# Patient Record
Sex: FEMALE | Race: ASIAN | NOT HISPANIC OR LATINO | URBAN - METROPOLITAN AREA
[De-identification: names, ages, dates, MRNs, and addresses within clinical notes are randomized per-mention and may not be internally consistent; named-entity substitution may affect disease eponyms.]

---

## 2017-09-24 ENCOUNTER — EMERGENCY (EMERGENCY)
Facility: HOSPITAL | Age: 72
LOS: 1 days | End: 2017-09-24

## 2018-07-18 ENCOUNTER — EMERGENCY (EMERGENCY)
Facility: HOSPITAL | Age: 73
LOS: 1 days | End: 2018-07-18
Payer: MEDICARE

## 2018-07-18 PROCEDURE — 99284 EMERGENCY DEPT VISIT MOD MDM: CPT

## 2018-07-18 PROCEDURE — 70450 CT HEAD/BRAIN W/O DYE: CPT | Mod: 26

## 2018-09-30 ENCOUNTER — RX RENEWAL (OUTPATIENT)
Age: 73
End: 2018-09-30

## 2018-09-30 PROBLEM — Z00.00 ENCOUNTER FOR PREVENTIVE HEALTH EXAMINATION: Status: ACTIVE | Noted: 2018-09-30

## 2018-11-09 ENCOUNTER — APPOINTMENT (OUTPATIENT)
Dept: ENDOCRINOLOGY | Facility: CLINIC | Age: 73
End: 2018-11-09
Payer: MEDICARE

## 2018-11-09 ENCOUNTER — RECORD ABSTRACTING (OUTPATIENT)
Age: 73
End: 2018-11-09

## 2018-11-09 VITALS
BODY MASS INDEX: 21.79 KG/M2 | HEIGHT: 60 IN | WEIGHT: 111 LBS | DIASTOLIC BLOOD PRESSURE: 70 MMHG | SYSTOLIC BLOOD PRESSURE: 120 MMHG | HEART RATE: 78 BPM

## 2018-11-09 DIAGNOSIS — Z83.3 FAMILY HISTORY OF DIABETES MELLITUS: ICD-10-CM

## 2018-11-09 DIAGNOSIS — Z86.39 PERSONAL HISTORY OF OTHER ENDOCRINE, NUTRITIONAL AND METABOLIC DISEASE: ICD-10-CM

## 2018-11-09 DIAGNOSIS — Z78.9 OTHER SPECIFIED HEALTH STATUS: ICD-10-CM

## 2018-11-09 PROCEDURE — 99214 OFFICE O/P EST MOD 30 MIN: CPT

## 2019-02-01 ENCOUNTER — OTHER (OUTPATIENT)
Age: 74
End: 2019-02-01

## 2019-03-15 ENCOUNTER — APPOINTMENT (OUTPATIENT)
Dept: ENDOCRINOLOGY | Facility: CLINIC | Age: 74
End: 2019-03-15

## 2019-03-20 ENCOUNTER — RX RENEWAL (OUTPATIENT)
Age: 74
End: 2019-03-20

## 2019-05-04 ENCOUNTER — RX RENEWAL (OUTPATIENT)
Age: 74
End: 2019-05-04

## 2019-05-06 ENCOUNTER — APPOINTMENT (OUTPATIENT)
Dept: ENDOCRINOLOGY | Facility: CLINIC | Age: 74
End: 2019-05-06
Payer: MEDICARE

## 2019-05-06 LAB — GLUCOSE BLDC GLUCOMTR-MCNC: 138

## 2019-05-06 PROCEDURE — 99214 OFFICE O/P EST MOD 30 MIN: CPT | Mod: 25

## 2019-05-06 PROCEDURE — 82962 GLUCOSE BLOOD TEST: CPT

## 2019-05-06 NOTE — HISTORY OF PRESENT ILLNESS
[FreeTextEntry1] : Quality:  Type 2.   \par Severity:  controlled\par Duration:  12 years\par Associated Symptoms:  Hypoglycemia \par Notes:  Current regimen:\par 	glipizide 5 am 10 pm\par 	janumet 50/1000 bid - cuts down to one at times due to cost\par \par Blood Glucose Testing Information \par \par Patient is using the  meter and is testing 2 times per day.\par \par \par Past Medical History\par Notes:  thyroid nodule. FNA 1996 limited; repeat FNA 2004 negative. \par

## 2019-05-06 NOTE — ASSESSMENT
[FreeTextEntry1] : DM type 2, with room for improvement. Pt. can do better with diet; I am reluctant to increase meds due to risk of adverse effects. IN fact may need less glipizide in the evening with smaller meals.\par hyperlipidemia controlled\par no palpable thyroid nodule

## 2019-05-30 ENCOUNTER — MEDICATION RENEWAL (OUTPATIENT)
Age: 74
End: 2019-05-30

## 2019-06-10 ENCOUNTER — RX RENEWAL (OUTPATIENT)
Age: 74
End: 2019-06-10

## 2019-08-02 ENCOUNTER — RX RENEWAL (OUTPATIENT)
Age: 74
End: 2019-08-02

## 2019-09-05 ENCOUNTER — OUTPATIENT (OUTPATIENT)
Dept: OUTPATIENT SERVICES | Facility: HOSPITAL | Age: 74
LOS: 1 days | End: 2019-09-05

## 2019-09-19 ENCOUNTER — APPOINTMENT (OUTPATIENT)
Dept: ENDOCRINOLOGY | Facility: CLINIC | Age: 74
End: 2019-09-19
Payer: MEDICARE

## 2019-09-19 VITALS
HEART RATE: 75 BPM | OXYGEN SATURATION: 97 % | HEIGHT: 60 IN | DIASTOLIC BLOOD PRESSURE: 70 MMHG | BODY MASS INDEX: 21.99 KG/M2 | SYSTOLIC BLOOD PRESSURE: 100 MMHG | WEIGHT: 112 LBS

## 2019-09-19 LAB
GLUCOSE BLDC GLUCOMTR-MCNC: 178
HBA1C MFR BLD HPLC: 7.7
LDLC SERPL CALC-MCNC: 87
MICROALBUMIN/CREAT 24H UR-RTO: 8

## 2019-09-19 PROCEDURE — 99214 OFFICE O/P EST MOD 30 MIN: CPT | Mod: 25

## 2019-09-19 PROCEDURE — 82962 GLUCOSE BLOOD TEST: CPT

## 2019-09-19 NOTE — PHYSICAL EXAM
[Thyroid Not Enlarged] : the thyroid was not enlarged [Clear to Auscultation] : lungs were clear to auscultation bilaterally [Regular Rhythm] : with a regular rhythm [Left Foot Was Examined] : left foot ~C was examined [Right Foot Was Examined] : right foot ~C was examined

## 2019-10-25 ENCOUNTER — RX RENEWAL (OUTPATIENT)
Age: 74
End: 2019-10-25

## 2020-01-19 ENCOUNTER — RX RENEWAL (OUTPATIENT)
Age: 75
End: 2020-01-19

## 2020-02-03 ENCOUNTER — APPOINTMENT (OUTPATIENT)
Dept: RADIOLOGY | Facility: CLINIC | Age: 75
End: 2020-02-03
Payer: MEDICARE

## 2020-02-03 PROCEDURE — 77080 DXA BONE DENSITY AXIAL: CPT

## 2020-02-05 ENCOUNTER — APPOINTMENT (OUTPATIENT)
Dept: ENDOCRINOLOGY | Facility: CLINIC | Age: 75
End: 2020-02-05

## 2020-03-15 ENCOUNTER — RX RENEWAL (OUTPATIENT)
Age: 75
End: 2020-03-15

## 2020-04-19 ENCOUNTER — RX RENEWAL (OUTPATIENT)
Age: 75
End: 2020-04-19

## 2020-04-22 ENCOUNTER — RX RENEWAL (OUTPATIENT)
Age: 75
End: 2020-04-22

## 2020-09-03 ENCOUNTER — RX RENEWAL (OUTPATIENT)
Age: 75
End: 2020-09-03

## 2020-10-15 ENCOUNTER — APPOINTMENT (OUTPATIENT)
Dept: CT IMAGING | Facility: CLINIC | Age: 75
End: 2020-10-15

## 2020-10-19 ENCOUNTER — RX RENEWAL (OUTPATIENT)
Age: 75
End: 2020-10-19

## 2020-10-26 ENCOUNTER — APPOINTMENT (OUTPATIENT)
Dept: ULTRASOUND IMAGING | Facility: CLINIC | Age: 75
End: 2020-10-26
Payer: MEDICARE

## 2020-10-26 PROCEDURE — 76536 US EXAM OF HEAD AND NECK: CPT

## 2020-10-26 PROCEDURE — 99072 ADDL SUPL MATRL&STAF TM PHE: CPT

## 2020-11-23 ENCOUNTER — APPOINTMENT (OUTPATIENT)
Dept: MAMMOGRAPHY | Facility: CLINIC | Age: 75
End: 2020-11-23
Payer: MEDICARE

## 2020-11-23 PROCEDURE — 77067 SCR MAMMO BI INCL CAD: CPT

## 2020-11-23 PROCEDURE — 77063 BREAST TOMOSYNTHESIS BI: CPT

## 2020-12-17 ENCOUNTER — RX RENEWAL (OUTPATIENT)
Age: 75
End: 2020-12-17

## 2020-12-22 ENCOUNTER — RX RENEWAL (OUTPATIENT)
Age: 75
End: 2020-12-22

## 2021-01-14 ENCOUNTER — APPOINTMENT (OUTPATIENT)
Dept: ENDOCRINOLOGY | Facility: CLINIC | Age: 76
End: 2021-01-14
Payer: MEDICARE

## 2021-01-14 VITALS
HEART RATE: 87 BPM | WEIGHT: 112 LBS | BODY MASS INDEX: 21.99 KG/M2 | DIASTOLIC BLOOD PRESSURE: 78 MMHG | OXYGEN SATURATION: 95 % | SYSTOLIC BLOOD PRESSURE: 132 MMHG | HEIGHT: 60 IN

## 2021-01-14 LAB — GLUCOSE BLDC GLUCOMTR-MCNC: 218

## 2021-01-14 PROCEDURE — 82962 GLUCOSE BLOOD TEST: CPT

## 2021-01-14 PROCEDURE — 99213 OFFICE O/P EST LOW 20 MIN: CPT | Mod: 25

## 2021-01-14 PROCEDURE — 99072 ADDL SUPL MATRL&STAF TM PHE: CPT

## 2021-01-14 NOTE — HISTORY OF PRESENT ILLNESS
[FreeTextEntry1] : Quality:  Type 2.   \par Severity:  controlled\par Duration:  13 years\par Associated Symptoms:  Hypoglycemia \par Notes:  Current regimen:\par 	glipizide 5 am 10 pm\par 	janumet 50/1000 bid \par \par Blood Glucose Testing Information \par \par Patient is using the  meter and is testing 2 times per day.\par \par \par Past Medical History\par Notes:  thyroid nodule. FNA 1996 limited; repeat FNA 2004 negative. \par thyroid ultrasound done several months ago

## 2021-01-14 NOTE — ASSESSMENT
[FreeTextEntry1] : thyroid nodule, requires follow up. WIll try to review recent ultrasound\par pt. prefers for PCP to care for her diabetes

## 2021-09-22 ENCOUNTER — RX RENEWAL (OUTPATIENT)
Age: 76
End: 2021-09-22

## 2021-09-24 ENCOUNTER — RX RENEWAL (OUTPATIENT)
Age: 76
End: 2021-09-24

## 2021-12-03 ENCOUNTER — APPOINTMENT (OUTPATIENT)
Dept: ENDOCRINOLOGY | Facility: CLINIC | Age: 76
End: 2021-12-03
Payer: MEDICARE

## 2021-12-03 VITALS
WEIGHT: 111 LBS | HEIGHT: 60 IN | DIASTOLIC BLOOD PRESSURE: 70 MMHG | HEART RATE: 84 BPM | SYSTOLIC BLOOD PRESSURE: 132 MMHG | BODY MASS INDEX: 21.79 KG/M2 | OXYGEN SATURATION: 96 %

## 2021-12-03 LAB — GLUCOSE BLDC GLUCOMTR-MCNC: 118

## 2021-12-03 PROCEDURE — 99214 OFFICE O/P EST MOD 30 MIN: CPT | Mod: 25

## 2021-12-03 PROCEDURE — 82962 GLUCOSE BLOOD TEST: CPT

## 2021-12-03 PROCEDURE — 95250 CONT GLUC MNTR PHYS/QHP EQP: CPT

## 2021-12-03 NOTE — ASSESSMENT
[FreeTextEntry1] : 77 y/o female with Type 2 Diabetes, Hypercholesterolemia, and thyroid nodule. \par \par Plan: \par Type 2 Diabetes: worsening \par - check c-peptide and glucose level now\par - placed [employer] sensor on left upper arm lot # [id number] \par - continue current Rx for now \par - depending on c-peptide results whether if she is still producing insulin \par - if c-peptide negative then consider Rybelsus, SGLT2 or Piogliazone \par - if c-peptide positive then will start basal insulin \par - continue current exercise regimen \par - follow a low fat/carb diet \par - continue to follow up with ophthalmology \par - repeat A1C in 3 months \par \par Hypercholesterolemia: continue statin \par - repeat lipids in 3 months \par \par Thyroid nodule: check thyroid sonogram now\par \par RTO in 2 weeks with NP or CDE \par \par Dr. Srinivasan reviewed chart and agrees with plan.  [Importance of Diet and Exercise] : importance of diet and exercise to improve glycemic control, achieve weight loss and improve cardiovascular health

## 2021-12-03 NOTE — REVIEW OF SYSTEMS
[Recent Weight Gain (___ Lbs)] : recent weight gain: [unfilled] lbs [Blurred Vision] : blurred vision [Fatigue] : no fatigue [Decreased Appetite] : appetite not decreased [Visual Field Defect] : no visual field defect [Dysphagia] : no dysphagia [Neck Pain] : no neck pain [Dysphonia] : no dysphonia [Chest Pain] : no chest pain [Palpitations] : no palpitations [Constipation] : no constipation [Diarrhea] : no diarrhea [Polyuria] : no polyuria [Dysuria] : no dysuria [Headaches] : no headaches [Tremors] : no tremors [Depression] : no depression [Anxiety] : no anxiety [Polydipsia] : no polydipsia [Swelling] : no swelling [FreeTextEntry3] : following up ophthalmology

## 2021-12-03 NOTE — HISTORY OF PRESENT ILLNESS
[FreeTextEntry1] : Quality: Type 2 DM \par Severity: controlled\par Duration: 13 years\par Associated Symptoms: Hypoglycemia \par \par Current Regimen:\par glipizide 5 mg 3 times a day \par janumet  bid \par \par Self blood sugar monitorin-2 times a day \par this morning 190 \par before lunch 130 \par Current \par \par exercise: daily - goes to gym \par \par Diet: 3 meals a day \par \par \par Date of last eye exam:  last week (-) DR, +cataracts both eyes and recently has eye surgery  \par Date of last foot exam: none \par Date of last flu vaccine: \par Date of last Pneumovax: no\par COVID Vaccine received plus booster \par \par Past Medical History\par Notes: thyroid nodule. FNA  limited; repeat FNA  negative. \par thyroid ultrasound done several months ago

## 2021-12-07 ENCOUNTER — APPOINTMENT (OUTPATIENT)
Dept: ULTRASOUND IMAGING | Facility: CLINIC | Age: 76
End: 2021-12-07
Payer: MEDICARE

## 2021-12-07 PROCEDURE — 76536 US EXAM OF HEAD AND NECK: CPT

## 2021-12-09 ENCOUNTER — NON-APPOINTMENT (OUTPATIENT)
Age: 76
End: 2021-12-09

## 2021-12-14 ENCOUNTER — NON-APPOINTMENT (OUTPATIENT)
Age: 76
End: 2021-12-14

## 2021-12-14 ENCOUNTER — APPOINTMENT (OUTPATIENT)
Dept: ENDOCRINOLOGY | Facility: CLINIC | Age: 76
End: 2021-12-14
Payer: MEDICARE

## 2021-12-14 PROCEDURE — G0108 DIAB MANAGE TRN  PER INDIV: CPT

## 2021-12-16 ENCOUNTER — NON-APPOINTMENT (OUTPATIENT)
Age: 76
End: 2021-12-16

## 2021-12-27 ENCOUNTER — RX RENEWAL (OUTPATIENT)
Age: 76
End: 2021-12-27

## 2022-01-12 ENCOUNTER — APPOINTMENT (OUTPATIENT)
Dept: MAMMOGRAPHY | Facility: CLINIC | Age: 77
End: 2022-01-12
Payer: MEDICARE

## 2022-01-12 PROCEDURE — 77067 SCR MAMMO BI INCL CAD: CPT

## 2022-01-12 PROCEDURE — 77063 BREAST TOMOSYNTHESIS BI: CPT

## 2022-02-17 RX ORDER — BLOOD-GLUCOSE METER
W/DEVICE EACH MISCELLANEOUS
Qty: 1 | Refills: 0 | Status: ACTIVE | COMMUNITY
Start: 2022-02-17 | End: 1900-01-01

## 2022-02-17 RX ORDER — BLOOD-GLUCOSE CONTROL, LOW
LOW EACH MISCELLANEOUS
Qty: 1 | Refills: 0 | Status: ACTIVE | COMMUNITY
Start: 2022-02-17 | End: 1900-01-01

## 2022-02-17 RX ORDER — BLOOD-GLUCOSE CONTROL, LOW
HIGH EACH MISCELLANEOUS
Qty: 1 | Refills: 0 | Status: ACTIVE | COMMUNITY
Start: 2022-02-17 | End: 1900-01-01

## 2022-02-17 RX ORDER — BLOOD-GLUCOSE CONTROL, LOW
NORMAL EACH MISCELLANEOUS
Qty: 1 | Refills: 0 | Status: ACTIVE | COMMUNITY
Start: 2022-02-17 | End: 1900-01-01

## 2022-04-13 ENCOUNTER — APPOINTMENT (OUTPATIENT)
Dept: ENDOCRINOLOGY | Facility: CLINIC | Age: 77
End: 2022-04-13
Payer: MEDICARE

## 2022-04-13 VITALS
DIASTOLIC BLOOD PRESSURE: 76 MMHG | HEIGHT: 60 IN | OXYGEN SATURATION: 96 % | HEART RATE: 74 BPM | BODY MASS INDEX: 22.19 KG/M2 | SYSTOLIC BLOOD PRESSURE: 124 MMHG | WEIGHT: 113 LBS

## 2022-04-13 LAB — GLUCOSE BLDC GLUCOMTR-MCNC: 132

## 2022-04-13 PROCEDURE — 82962 GLUCOSE BLOOD TEST: CPT

## 2022-04-13 PROCEDURE — 99214 OFFICE O/P EST MOD 30 MIN: CPT | Mod: 25

## 2022-04-13 NOTE — ASSESSMENT
[Importance of Diet and Exercise] : importance of diet and exercise to improve glycemic control, achieve weight loss and improve cardiovascular health [FreeTextEntry1] : DM type 2, appears stable\par hyperlipidemia, on statin therapy\par MNG, no significant changes\par \par update labs\par

## 2022-04-13 NOTE — HISTORY OF PRESENT ILLNESS
[FreeTextEntry1] : Quality: Type 2 DM \par Severity: controlled (MARD)\par Duration: 14 years\par Associated Symptoms: Hypoglycemia \par \par Current Regimen:\par glipizide 5 mg 3 times a day \par janumet  bid \par \par Self blood sugar monitorin-2 times a day \par reports 120 to 160\par exercise: daily - goes to gym \par \par Diet: 3 meals a day \par \par \par Date of last eye exam:  last week (-) DR, +cataracts both eyes and recently has eye surgery  \par Date of last foot exam: none \par Date of last flu vaccine: \par Date of last Pneumovax: no\par COVID Vaccine received plus booster \par \par Past Medical History\par Notes: thyroid nodule. FNA  limited; repeat FNA  negative. \par thyroid ultrasound done 2021 not significantly changed

## 2022-05-31 ENCOUNTER — OUTPATIENT (OUTPATIENT)
Dept: OUTPATIENT SERVICES | Facility: HOSPITAL | Age: 77
LOS: 1 days | End: 2022-05-31

## 2022-05-31 DIAGNOSIS — E11.69 TYPE 2 DIABETES MELLITUS WITH OTHER SPECIFIED COMPLICATION: ICD-10-CM

## 2022-05-31 DIAGNOSIS — Z13.820 ENCOUNTER FOR SCREENING FOR OSTEOPOROSIS: ICD-10-CM

## 2022-05-31 DIAGNOSIS — E78.5 HYPERLIPIDEMIA, UNSPECIFIED: ICD-10-CM

## 2022-06-27 ENCOUNTER — APPOINTMENT (OUTPATIENT)
Dept: ULTRASOUND IMAGING | Facility: CLINIC | Age: 77
End: 2022-06-27

## 2022-06-27 ENCOUNTER — APPOINTMENT (OUTPATIENT)
Dept: RADIOLOGY | Facility: CLINIC | Age: 77
End: 2022-06-27

## 2022-06-27 PROCEDURE — 76700 US EXAM ABDOM COMPLETE: CPT

## 2022-06-27 PROCEDURE — 77080 DXA BONE DENSITY AXIAL: CPT

## 2022-07-08 ENCOUNTER — NON-APPOINTMENT (OUTPATIENT)
Age: 77
End: 2022-07-08

## 2022-07-08 ENCOUNTER — APPOINTMENT (OUTPATIENT)
Dept: OPHTHALMOLOGY | Facility: CLINIC | Age: 77
End: 2022-07-08

## 2022-07-08 PROCEDURE — 92004 COMPRE OPH EXAM NEW PT 1/>: CPT

## 2022-07-25 ENCOUNTER — APPOINTMENT (OUTPATIENT)
Dept: OPHTHALMOLOGY | Facility: CLINIC | Age: 77
End: 2022-07-25

## 2022-08-08 ENCOUNTER — APPOINTMENT (OUTPATIENT)
Dept: ENDOCRINOLOGY | Facility: CLINIC | Age: 77
End: 2022-08-08

## 2022-08-22 ENCOUNTER — RX RENEWAL (OUTPATIENT)
Age: 77
End: 2022-08-22

## 2022-09-06 ENCOUNTER — RX RENEWAL (OUTPATIENT)
Age: 77
End: 2022-09-06

## 2022-10-25 LAB
HBA1C MFR BLD HPLC: 7.9
LDLC SERPL DIRECT ASSAY-MCNC: 67
MICROALBUMIN/CREAT 24H UR-RTO: 8
TSH SERPL-ACNC: 2.52

## 2022-10-26 ENCOUNTER — RX RENEWAL (OUTPATIENT)
Age: 77
End: 2022-10-26

## 2022-10-26 ENCOUNTER — APPOINTMENT (OUTPATIENT)
Dept: ENDOCRINOLOGY | Facility: CLINIC | Age: 77
End: 2022-10-26

## 2022-10-26 VITALS
HEART RATE: 78 BPM | BODY MASS INDEX: 21.99 KG/M2 | SYSTOLIC BLOOD PRESSURE: 120 MMHG | WEIGHT: 112 LBS | DIASTOLIC BLOOD PRESSURE: 70 MMHG | HEIGHT: 60 IN

## 2022-10-26 LAB — GLUCOSE BLDC GLUCOMTR-MCNC: 132

## 2022-10-26 PROCEDURE — 82962 GLUCOSE BLOOD TEST: CPT

## 2022-10-26 PROCEDURE — 99214 OFFICE O/P EST MOD 30 MIN: CPT | Mod: 25

## 2022-10-26 RX ORDER — OMEPRAZOLE 40 MG/1
40 CAPSULE, DELAYED RELEASE ORAL
Refills: 0 | Status: DISCONTINUED | COMMUNITY
End: 2022-10-26

## 2022-10-26 RX ORDER — NIRMATRELVIR AND RITONAVIR 300-100 MG
20 X 150 MG & KIT ORAL
Qty: 30 | Refills: 0 | Status: COMPLETED | COMMUNITY
Start: 2022-08-07 | End: 2022-10-26

## 2022-10-26 NOTE — PHYSICAL EXAM
[Clear to Auscultation] : lungs were clear to auscultation bilaterally [Normal Rate] : heart rate was normal [de-identified] : thyroid difficult to palpate

## 2022-10-26 NOTE — HISTORY OF PRESENT ILLNESS
[FreeTextEntry1] : Quality: Type 2 DM \par Severity: controlled (MARD)\par Duration: 14 years\par Associated Symptoms: Hypoglycemia \par \par Current Regimen:\par glipizide 5 mg 3 times a day \par janumet  bid \par \par Self blood sugar monitorin-2 times a day \par reports 120 to 160\par exercise: daily - goes to gym \par \par Diet: 3 meals a day \par \par \par Date of last eye exam:  last week (-) DR, +cataracts both eyes and recently has eye surgery  \par Date of last foot exam: none \par Date of last flu vaccine: \par Date of last Pneumovax: no\par COVID Vaccine received plus booster \par s/p COVID and paxlovid\par \par Past Medical History\par Notes: thyroid nodule. FNA  limited; repeat FNA  negative. \par thyroid ultrasound done 2021 not significantly changed

## 2022-10-26 NOTE — ASSESSMENT
[FreeTextEntry1] : DM type 2, appears stable\par hyperlipidemia, on statin therapy\par MNG, no significant changes\par \par update labs\par

## 2023-04-20 NOTE — ASSESSMENT
TRANSFER - OUT REPORT:    Verbal report given to ED RN(name) on Shakira Cordova  being transferred to ED hallway B(unit) for routine progression of care       Report consisted of patients Situation, Background, Assessment and   Recommendations(SBAR). Information from the following report(s) Procedure Summary and MAR was reviewed with the receiving nurse. Opportunity for questions and clarification was provided.       Patient transported with:   Registered Nurse
[FreeTextEntry1] : DM type 2, with room for improvement. Pt. can do better with diet; I am reluctant to increase meds due to risk of adverse effects. \par hyperlipidemia controlled\par no palpable thyroid nodule
[facility]

## 2023-04-26 ENCOUNTER — APPOINTMENT (OUTPATIENT)
Dept: ENDOCRINOLOGY | Facility: CLINIC | Age: 78
End: 2023-04-26
Payer: MEDICARE

## 2023-04-26 VITALS
DIASTOLIC BLOOD PRESSURE: 82 MMHG | BODY MASS INDEX: 21.6 KG/M2 | OXYGEN SATURATION: 95 % | WEIGHT: 110 LBS | HEART RATE: 82 BPM | HEIGHT: 60 IN | SYSTOLIC BLOOD PRESSURE: 120 MMHG

## 2023-04-26 LAB — GLUCOSE BLDC GLUCOMTR-MCNC: 169

## 2023-04-26 PROCEDURE — 99214 OFFICE O/P EST MOD 30 MIN: CPT | Mod: 25

## 2023-04-26 PROCEDURE — 82962 GLUCOSE BLOOD TEST: CPT

## 2023-04-26 NOTE — HISTORY OF PRESENT ILLNESS
[FreeTextEntry1] : Quality: Type 2 DM \par Severity: controlled (MARD)\par Duration: 15 years\par Associated Symptoms: Hypoglycemia \par \par Current Regimen:\par glipizide 5 mg 3 times a day \par janumet  bid \par \par Self blood sugar monitorin-2 times a day \par reports 120 to 160\par exercise: daily - goes to gym \par \par Diet: 3 meals a day \par \par \par Date of last eye exam:  last week (-) DR, +cataracts both eyes and recently has eye surgery  \par Date of last foot exam: none \par Date of last flu vaccine: \par Date of last Pneumovax: no\par COVID Vaccine received plus booster \par s/p COVID and paxlovid\par \par Past Medical History\par Notes: thyroid nodule. FNA  limited; repeat FNA  negative. \par thyroid ultrasound done 2021 not significantly changed

## 2023-06-16 ENCOUNTER — APPOINTMENT (OUTPATIENT)
Dept: ENDOCRINOLOGY | Facility: CLINIC | Age: 78
End: 2023-06-16
Payer: MEDICARE

## 2023-06-16 ENCOUNTER — APPOINTMENT (OUTPATIENT)
Dept: ENDOCRINOLOGY | Facility: CLINIC | Age: 78
End: 2023-06-16

## 2023-06-16 PROCEDURE — ZZZZZ: CPT

## 2023-06-20 ENCOUNTER — APPOINTMENT (OUTPATIENT)
Dept: MAMMOGRAPHY | Facility: CLINIC | Age: 78
End: 2023-06-20
Payer: MEDICARE

## 2023-06-20 PROCEDURE — 77067 SCR MAMMO BI INCL CAD: CPT

## 2023-06-20 PROCEDURE — 77063 BREAST TOMOSYNTHESIS BI: CPT

## 2023-07-07 ENCOUNTER — NON-APPOINTMENT (OUTPATIENT)
Age: 78
End: 2023-07-07

## 2023-07-27 ENCOUNTER — RESULT REVIEW (OUTPATIENT)
Age: 78
End: 2023-07-27

## 2023-08-10 ENCOUNTER — RX RENEWAL (OUTPATIENT)
Age: 78
End: 2023-08-10

## 2023-09-11 RX ORDER — LANCETS
EACH MISCELLANEOUS
Qty: 200 | Refills: 0 | Status: COMPLETED | COMMUNITY
Start: 2022-03-11 | End: 2023-09-11

## 2023-09-11 RX ORDER — BLOOD-GLUCOSE METER
W/DEVICE EACH MISCELLANEOUS
Qty: 1 | Refills: 0 | Status: COMPLETED | COMMUNITY
Start: 2022-03-11 | End: 2023-09-11

## 2023-11-20 ENCOUNTER — APPOINTMENT (OUTPATIENT)
Dept: ENDOCRINOLOGY | Facility: CLINIC | Age: 78
End: 2023-11-20
Payer: MEDICARE

## 2023-11-20 VITALS
BODY MASS INDEX: 22.38 KG/M2 | HEART RATE: 98 BPM | DIASTOLIC BLOOD PRESSURE: 80 MMHG | WEIGHT: 114 LBS | HEIGHT: 60 IN | SYSTOLIC BLOOD PRESSURE: 140 MMHG

## 2023-11-20 LAB
GLUCOSE BLDC GLUCOMTR-MCNC: 155
HBA1C MFR BLD HPLC: 8.5
LDLC SERPL DIRECT ASSAY-MCNC: 98
TSH SERPL-ACNC: 2.73

## 2023-11-20 PROCEDURE — 99214 OFFICE O/P EST MOD 30 MIN: CPT | Mod: 25

## 2023-11-20 PROCEDURE — 82962 GLUCOSE BLOOD TEST: CPT

## 2023-12-01 ENCOUNTER — RX RENEWAL (OUTPATIENT)
Age: 78
End: 2023-12-01

## 2023-12-01 RX ORDER — SITAGLIPTIN AND METFORMIN HYDROCHLORIDE 50; 1000 MG/1; MG/1
50-1000 TABLET, FILM COATED ORAL
Qty: 180 | Refills: 3 | Status: ACTIVE | COMMUNITY
Start: 2018-09-30 | End: 1900-01-01

## 2024-01-18 ENCOUNTER — RX RENEWAL (OUTPATIENT)
Age: 79
End: 2024-01-18

## 2024-01-18 RX ORDER — BLOOD SUGAR DIAGNOSTIC
STRIP MISCELLANEOUS
Qty: 100 | Refills: 3 | Status: ACTIVE | COMMUNITY
Start: 2022-03-11 | End: 1900-01-01

## 2024-02-02 ENCOUNTER — APPOINTMENT (OUTPATIENT)
Dept: ULTRASOUND IMAGING | Facility: CLINIC | Age: 79
End: 2024-02-02
Payer: MEDICARE

## 2024-02-02 PROCEDURE — 76700 US EXAM ABDOM COMPLETE: CPT

## 2024-02-05 ENCOUNTER — APPOINTMENT (OUTPATIENT)
Dept: CARDIOLOGY | Facility: CLINIC | Age: 79
End: 2024-02-05
Payer: MEDICARE

## 2024-02-05 VITALS
SYSTOLIC BLOOD PRESSURE: 112 MMHG | OXYGEN SATURATION: 95 % | DIASTOLIC BLOOD PRESSURE: 62 MMHG | WEIGHT: 113 LBS | HEART RATE: 87 BPM | BODY MASS INDEX: 22.07 KG/M2

## 2024-02-05 DIAGNOSIS — Z78.9 OTHER SPECIFIED HEALTH STATUS: ICD-10-CM

## 2024-02-05 DIAGNOSIS — E11.65 TYPE 2 DIABETES MELLITUS WITH HYPERGLYCEMIA: ICD-10-CM

## 2024-02-05 PROCEDURE — 93000 ELECTROCARDIOGRAM COMPLETE: CPT

## 2024-02-05 PROCEDURE — 99205 OFFICE O/P NEW HI 60 MIN: CPT

## 2024-02-05 NOTE — HISTORY OF PRESENT ILLNESS
[FreeTextEntry1] : 78 years old [place] female patient with history of diabetes, dyslipidemia referred for cardiac evaluation.  She does get short of breath on more than usual exertion but denies any chest pain.  She denies PND, orthopnea, diaphoresis, dizziness, palpitation, per edema.  No prior history of CHF, MI, syncope.  No prior cardiac evaluation.  Diabetes control is not clear to me

## 2024-02-05 NOTE — DISCUSSION/SUMMARY
[FreeTextEntry1] : Shortness of breath with multiple CAD risk factors including diabetes and dyslipidemia -I recommended echocardiography for LV size, LV thickness, and wall motion, LVEF and valve evaluation.  I also recommend exercise marker perfusion scan to see inducible ischemia.  Diabetes -long-term goals blood pressure less than 130/80, A1c less than 7, LDL 70; diabetic.  Dyslipidemia -low-cholesterol, continue current medications, recent lipid panel not available to me.  Aggressive risk factor modification has been discussed with the great length.  She will be reeval by me after cardiac testing

## 2024-02-29 ENCOUNTER — APPOINTMENT (OUTPATIENT)
Dept: CARDIOLOGY | Facility: CLINIC | Age: 79
End: 2024-02-29
Payer: MEDICARE

## 2024-02-29 PROCEDURE — 93306 TTE W/DOPPLER COMPLETE: CPT

## 2024-02-29 PROCEDURE — 93880 EXTRACRANIAL BILAT STUDY: CPT

## 2024-03-07 ENCOUNTER — APPOINTMENT (OUTPATIENT)
Dept: CARDIOLOGY | Facility: CLINIC | Age: 79
End: 2024-03-07
Payer: MEDICARE

## 2024-03-07 PROCEDURE — A9502: CPT

## 2024-03-07 PROCEDURE — 93015 CV STRESS TEST SUPVJ I&R: CPT

## 2024-03-07 PROCEDURE — 78452 HT MUSCLE IMAGE SPECT MULT: CPT

## 2024-04-18 LAB — HBA1C MFR BLD HPLC: 7.7

## 2024-04-22 ENCOUNTER — APPOINTMENT (OUTPATIENT)
Dept: CARDIOLOGY | Facility: CLINIC | Age: 79
End: 2024-04-22
Payer: MEDICARE

## 2024-04-22 VITALS — SYSTOLIC BLOOD PRESSURE: 120 MMHG | DIASTOLIC BLOOD PRESSURE: 68 MMHG

## 2024-04-22 VITALS — BODY MASS INDEX: 22.46 KG/M2 | WEIGHT: 115 LBS | HEART RATE: 81 BPM | OXYGEN SATURATION: 96 %

## 2024-04-22 DIAGNOSIS — E04.1 NONTOXIC SINGLE THYROID NODULE: ICD-10-CM

## 2024-04-22 DIAGNOSIS — R06.02 SHORTNESS OF BREATH: ICD-10-CM

## 2024-04-22 PROCEDURE — 99214 OFFICE O/P EST MOD 30 MIN: CPT

## 2024-04-22 RX ORDER — GLIPIZIDE 5 MG/1
5 TABLET ORAL DAILY
Qty: 180 | Refills: 2 | Status: ACTIVE | COMMUNITY
Start: 2019-05-04

## 2024-04-22 NOTE — HISTORY OF PRESENT ILLNESS
[FreeTextEntry1] : 78 years old [place] female patient with history of diabetes, dyslipidemia is here for cardiac testing follow-up  She does get short of breath on more than usual exertion but denies any chest pain.  She denies PND, orthopnea, diaphoresis, dizziness, palpitation, per edema.  March 7, 2024 exercise nuclear stress test was done using Taran protocol; she exercised for 7 minutes with a peak blood pressure 184/60 mmHg 6 inferoseptal defect suggestive of attenuation. February 29, 2024 carotid Doppler showed mild plaquing without hemodynamically significant stenotic lesion February 29, 2024  echocardiogram showed normal size, LV thickness, and wall motion, LVEF 65% without significant valvular abnormality  No prior history of CHF, MI, syncope.  No prior cardiac evaluation.  Diabetes control is not clear to me

## 2024-04-22 NOTE — DISCUSSION/SUMMARY
[FreeTextEntry1] : Shortness of breath with multiple CAD risk factors including diabetes and dyslipidemia -echo confirmed normal LV size, LV thickness, and wall motion, LVEF and valve morphology.  Exercise nuclear stress test did not show any inducible ischemia.  No need for further invasive testing  Diabetes -long-term goals blood pressure less than 130/80, A1c less than 7, LDL 70; diabetic.  Dyslipidemia -low-cholesterol, continue current medications, recent lipid panel not available to me.  Aggressive risk factor modification has been discussed with the great length.  She will be reeval by me in 6 months.

## 2024-05-19 ENCOUNTER — RX RENEWAL (OUTPATIENT)
Age: 79
End: 2024-05-19

## 2024-05-19 RX ORDER — SIMVASTATIN 10 MG/1
10 TABLET, FILM COATED ORAL
Qty: 90 | Refills: 2 | Status: ACTIVE | COMMUNITY
Start: 2019-03-20 | End: 1900-01-01

## 2024-05-20 ENCOUNTER — APPOINTMENT (OUTPATIENT)
Dept: ENDOCRINOLOGY | Facility: CLINIC | Age: 79
End: 2024-05-20
Payer: MEDICARE

## 2024-05-20 VITALS
DIASTOLIC BLOOD PRESSURE: 80 MMHG | OXYGEN SATURATION: 93 % | HEIGHT: 60 IN | BODY MASS INDEX: 22.19 KG/M2 | WEIGHT: 113 LBS | HEART RATE: 73 BPM | SYSTOLIC BLOOD PRESSURE: 126 MMHG

## 2024-05-20 DIAGNOSIS — E11.9 TYPE 2 DIABETES MELLITUS W/OUT COMPLICATIONS: ICD-10-CM

## 2024-05-20 DIAGNOSIS — E78.00 PURE HYPERCHOLESTEROLEMIA, UNSPECIFIED: ICD-10-CM

## 2024-05-20 PROCEDURE — 99214 OFFICE O/P EST MOD 30 MIN: CPT

## 2024-05-20 PROCEDURE — G2211 COMPLEX E/M VISIT ADD ON: CPT

## 2024-05-20 NOTE — HISTORY OF PRESENT ILLNESS
[FreeTextEntry1] : Quality: Type 2 DM  Severity: controlled (MARD) Duration: 16 years Associated Symptoms: Hypoglycemia   Current Regimen: glipizide 5 mg 2 times a day  janumet  bid   Self blood sugar monitorin-2 times a day  reports 120 to 160 exercise: daily - goes to gym   Diet: 3 meals a day    Date of last eye exam:  last week (-) DR, +cataracts both eyes and recently has eye surgery   Date of last foot exam: none  Date of last flu vaccine:  Date of last Pneumovax: no COVID Vaccine received plus booster  s/p COVID and paxlovid  Past Medical History Notes: thyroid nodule. FNA  limited; repeat FNA  negative.  thyroid ultrasound done 2021 not significantly changed

## 2024-06-03 ENCOUNTER — APPOINTMENT (OUTPATIENT)
Dept: RADIOLOGY | Facility: CLINIC | Age: 79
End: 2024-06-03
Payer: MEDICARE

## 2024-06-03 PROCEDURE — 71046 X-RAY EXAM CHEST 2 VIEWS: CPT

## 2024-07-03 ENCOUNTER — RX RENEWAL (OUTPATIENT)
Age: 79
End: 2024-07-03

## 2024-07-24 ENCOUNTER — APPOINTMENT (OUTPATIENT)
Dept: FAMILY MEDICINE | Facility: CLINIC | Age: 79
End: 2024-07-24
Payer: MEDICARE

## 2024-07-24 VITALS
BODY MASS INDEX: 21.22 KG/M2 | DIASTOLIC BLOOD PRESSURE: 60 MMHG | TEMPERATURE: 97.8 F | OXYGEN SATURATION: 97 % | RESPIRATION RATE: 16 BRPM | SYSTOLIC BLOOD PRESSURE: 120 MMHG | HEIGHT: 61 IN | HEART RATE: 90 BPM | WEIGHT: 112.38 LBS

## 2024-07-24 DIAGNOSIS — E78.00 PURE HYPERCHOLESTEROLEMIA, UNSPECIFIED: ICD-10-CM

## 2024-07-24 DIAGNOSIS — E04.1 NONTOXIC SINGLE THYROID NODULE: ICD-10-CM

## 2024-07-24 DIAGNOSIS — E11.9 TYPE 2 DIABETES MELLITUS W/OUT COMPLICATIONS: ICD-10-CM

## 2024-07-24 PROCEDURE — 99204 OFFICE O/P NEW MOD 45 MIN: CPT | Mod: 25

## 2024-07-24 PROCEDURE — 96372 THER/PROPH/DIAG INJ SC/IM: CPT

## 2024-07-24 RX ADMIN — CYANOCOBALAMIN 0 MCG/ML: 1000 INJECTION INTRAMUSCULAR; SUBCUTANEOUS at 00:00

## 2024-07-24 NOTE — HEALTH RISK ASSESSMENT
[0] : 2) Feeling down, depressed, or hopeless: Not at all (0) [PHQ-2 Negative - No further assessment needed] : PHQ-2 Negative - No further assessment needed [ALB2Gamnr] : 0

## 2024-07-24 NOTE — HISTORY OF PRESENT ILLNESS
[FreeTextEntry1] : new [de-identified] : tired  b12  d [facility]   3   9 gc    gym  tob-   etoh soc

## 2024-07-24 NOTE — PLAN
[FreeTextEntry1] : 78-year-old female presents to establish health care at this facility /3 children/9 grandchildren Non-smoker/social drinker Enjoys going to the gym and working in her garden Chronic fatigue-feeling exceptionally tired when she comes in in the evening she feels exhausted immediately wants to go to sleep.  A B12 shot is empirically trialed and lab work is drawn for metabolic profile Hyperlipidemia-controlled on the antilipid medication lab work again is drawn Thyroid nodule-periodic lab work to evaluate thyroid function

## 2024-07-25 RX ORDER — CYANOCOBALAMIN 1000 UG/ML
1000 INJECTION INTRAMUSCULAR; SUBCUTANEOUS
Qty: 0 | Refills: 0 | Status: COMPLETED | OUTPATIENT
Start: 2024-07-24

## 2024-08-01 LAB
ALBUMIN SERPL ELPH-MCNC: 4.6 G/DL
ALP BLD-CCNC: 89 U/L
ALT SERPL-CCNC: 16 U/L
ANION GAP SERPL CALC-SCNC: 14 MMOL/L
AST SERPL-CCNC: 17 U/L
BASOPHILS # BLD AUTO: 0.07 K/UL
BASOPHILS NFR BLD AUTO: 0.8 %
BILIRUB SERPL-MCNC: 0.3 MG/DL
BUN SERPL-MCNC: 16 MG/DL
CALCIUM SERPL-MCNC: 10.1 MG/DL
CHLORIDE SERPL-SCNC: 101 MMOL/L
CHOLEST SERPL-MCNC: 177 MG/DL
CO2 SERPL-SCNC: 26 MMOL/L
CREAT SERPL-MCNC: 0.73 MG/DL
EGFR: 85 ML/MIN/1.73M2
EOSINOPHIL # BLD AUTO: 1.2 K/UL
EOSINOPHIL NFR BLD AUTO: 14.5 %
ERYTHROCYTE [SEDIMENTATION RATE] IN BLOOD BY WESTERGREN METHOD: 17 MM/HR
ESTIMATED AVERAGE GLUCOSE: 194 MG/DL
FOLATE SERPL-MCNC: 19.8 NG/ML
GLUCOSE SERPL-MCNC: 131 MG/DL
HBA1C MFR BLD HPLC: 8.4 %
HCT VFR BLD CALC: 43.7 %
HDLC SERPL-MCNC: 64 MG/DL
HGB BLD-MCNC: 13.8 G/DL
IMM GRANULOCYTES NFR BLD AUTO: 0.2 %
LDLC SERPL CALC-MCNC: 78 MG/DL
LYMPHOCYTES # BLD AUTO: 2.38 K/UL
LYMPHOCYTES NFR BLD AUTO: 28.7 %
MAN DIFF?: NORMAL
MCHC RBC-ENTMCNC: 30.5 PG
MCHC RBC-ENTMCNC: 31.6 GM/DL
MCV RBC AUTO: 96.7 FL
MONOCYTES # BLD AUTO: 0.46 K/UL
MONOCYTES NFR BLD AUTO: 5.6 %
NEUTROPHILS # BLD AUTO: 4.15 K/UL
NEUTROPHILS NFR BLD AUTO: 50.2 %
NONHDLC SERPL-MCNC: 113 MG/DL
PLATELET # BLD AUTO: 292 K/UL
POTASSIUM SERPL-SCNC: 4.6 MMOL/L
PROT SERPL-MCNC: 7.1 G/DL
RBC # BLD: 4.52 M/UL
RBC # FLD: 12.6 %
SODIUM SERPL-SCNC: 141 MMOL/L
TRIGL SERPL-MCNC: 210 MG/DL
TSH SERPL-ACNC: 1.18 UIU/ML
VIT B12 SERPL-MCNC: 866 PG/ML
WBC # FLD AUTO: 8.28 K/UL

## 2024-10-14 ENCOUNTER — APPOINTMENT (OUTPATIENT)
Dept: CARDIOLOGY | Facility: CLINIC | Age: 79
End: 2024-10-14

## 2024-10-17 ENCOUNTER — RX RENEWAL (OUTPATIENT)
Age: 79
End: 2024-10-17

## 2024-12-09 ENCOUNTER — RX RENEWAL (OUTPATIENT)
Age: 79
End: 2024-12-09

## 2024-12-26 ENCOUNTER — RX RENEWAL (OUTPATIENT)
Age: 79
End: 2024-12-26

## 2025-01-13 ENCOUNTER — APPOINTMENT (OUTPATIENT)
Dept: ENDOCRINOLOGY | Facility: CLINIC | Age: 80
End: 2025-01-13

## 2025-01-27 ENCOUNTER — APPOINTMENT (OUTPATIENT)
Dept: ENDOCRINOLOGY | Facility: CLINIC | Age: 80
End: 2025-01-27
Payer: MEDICARE

## 2025-01-27 VITALS
DIASTOLIC BLOOD PRESSURE: 80 MMHG | WEIGHT: 109 LBS | HEART RATE: 83 BPM | BODY MASS INDEX: 20.58 KG/M2 | SYSTOLIC BLOOD PRESSURE: 124 MMHG | HEIGHT: 61 IN | OXYGEN SATURATION: 98 %

## 2025-01-27 DIAGNOSIS — E11.9 TYPE 2 DIABETES MELLITUS W/OUT COMPLICATIONS: ICD-10-CM

## 2025-01-27 DIAGNOSIS — E04.1 NONTOXIC SINGLE THYROID NODULE: ICD-10-CM

## 2025-01-27 DIAGNOSIS — E78.00 PURE HYPERCHOLESTEROLEMIA, UNSPECIFIED: ICD-10-CM

## 2025-01-27 LAB — GLUCOSE BLDC GLUCOMTR-MCNC: 235

## 2025-01-27 PROCEDURE — 82962 GLUCOSE BLOOD TEST: CPT

## 2025-01-27 PROCEDURE — G2211 COMPLEX E/M VISIT ADD ON: CPT

## 2025-01-27 PROCEDURE — 99214 OFFICE O/P EST MOD 30 MIN: CPT

## 2025-02-25 RX ORDER — METFORMIN HYDROCHLORIDE 1000 MG/1
1000 TABLET, COATED ORAL
Qty: 180 | Refills: 1 | Status: ACTIVE | COMMUNITY
Start: 2025-02-19 | End: 1900-01-01

## 2025-02-25 RX ORDER — SEMAGLUTIDE 0.68 MG/ML
2 INJECTION, SOLUTION SUBCUTANEOUS
Qty: 3 | Refills: 1 | Status: ACTIVE | COMMUNITY
Start: 2025-02-19 | End: 1900-01-01

## 2025-03-05 ENCOUNTER — APPOINTMENT (OUTPATIENT)
Dept: ENDOCRINOLOGY | Facility: CLINIC | Age: 80
End: 2025-03-05
Payer: MEDICARE

## 2025-03-05 DIAGNOSIS — E11.9 TYPE 2 DIABETES MELLITUS W/OUT COMPLICATIONS: ICD-10-CM

## 2025-03-05 PROCEDURE — 98960 EDU&TRN PT SELF-MGMT NQHP 1: CPT

## 2025-04-16 ENCOUNTER — APPOINTMENT (OUTPATIENT)
Dept: MAMMOGRAPHY | Facility: CLINIC | Age: 80
End: 2025-04-16
Payer: MEDICARE

## 2025-04-16 ENCOUNTER — APPOINTMENT (OUTPATIENT)
Dept: RADIOLOGY | Facility: CLINIC | Age: 80
End: 2025-04-16

## 2025-04-16 ENCOUNTER — APPOINTMENT (OUTPATIENT)
Dept: ULTRASOUND IMAGING | Facility: CLINIC | Age: 80
End: 2025-04-16

## 2025-04-16 PROCEDURE — 76641 ULTRASOUND BREAST COMPLETE: CPT | Mod: 50

## 2025-04-16 PROCEDURE — 77080 DXA BONE DENSITY AXIAL: CPT

## 2025-04-16 PROCEDURE — 77063 BREAST TOMOSYNTHESIS BI: CPT

## 2025-04-16 PROCEDURE — 77067 SCR MAMMO BI INCL CAD: CPT

## 2025-05-26 ENCOUNTER — RX RENEWAL (OUTPATIENT)
Age: 80
End: 2025-05-26

## 2025-07-09 ENCOUNTER — APPOINTMENT (OUTPATIENT)
Dept: ENDOCRINOLOGY | Facility: CLINIC | Age: 80
End: 2025-07-09
Payer: MEDICARE

## 2025-07-09 VITALS
WEIGHT: 108 LBS | HEIGHT: 61 IN | DIASTOLIC BLOOD PRESSURE: 72 MMHG | HEART RATE: 73 BPM | SYSTOLIC BLOOD PRESSURE: 120 MMHG | OXYGEN SATURATION: 95 % | BODY MASS INDEX: 20.39 KG/M2

## 2025-07-09 LAB — GLUCOSE BLDC GLUCOMTR-MCNC: 198

## 2025-07-09 PROCEDURE — 82962 GLUCOSE BLOOD TEST: CPT

## 2025-07-09 PROCEDURE — 99214 OFFICE O/P EST MOD 30 MIN: CPT

## 2025-07-09 PROCEDURE — G2211 COMPLEX E/M VISIT ADD ON: CPT

## 2025-07-17 ENCOUNTER — RESULT REVIEW (OUTPATIENT)
Age: 80
End: 2025-07-17

## 2025-08-06 ENCOUNTER — APPOINTMENT (OUTPATIENT)
Dept: ENDOCRINOLOGY | Facility: CLINIC | Age: 80
End: 2025-08-06

## 2025-08-14 ENCOUNTER — APPOINTMENT (OUTPATIENT)
Dept: ENDOCRINOLOGY | Facility: CLINIC | Age: 80
End: 2025-08-14